# Patient Record
Sex: FEMALE | Race: WHITE
[De-identification: names, ages, dates, MRNs, and addresses within clinical notes are randomized per-mention and may not be internally consistent; named-entity substitution may affect disease eponyms.]

---

## 2021-04-18 ENCOUNTER — HOSPITAL ENCOUNTER (EMERGENCY)
Dept: HOSPITAL 47 - EC | Age: 31
Discharge: HOME | End: 2021-04-18
Payer: COMMERCIAL

## 2021-04-18 VITALS — RESPIRATION RATE: 18 BRPM | SYSTOLIC BLOOD PRESSURE: 100 MMHG | HEART RATE: 80 BPM | DIASTOLIC BLOOD PRESSURE: 66 MMHG

## 2021-04-18 VITALS — TEMPERATURE: 98 F

## 2021-04-18 DIAGNOSIS — O20.9: Primary | ICD-10-CM

## 2021-04-18 DIAGNOSIS — Z88.5: ICD-10-CM

## 2021-04-18 DIAGNOSIS — Z3A.14: ICD-10-CM

## 2021-04-18 PROCEDURE — 99283 EMERGENCY DEPT VISIT LOW MDM: CPT

## 2021-04-18 NOTE — ED
Female Urogenital HPI





- General


Chief complaint: Vaginal Bleeding


Stated complaint: Vaginal Bleeding, 14 Weeks Preg


Source: patient


Mode of arrival: wheelchair


Limitations: no limitations





- History of Present Illness


Initial comments: 


James is a 29 yo P4 2013 female in weeks pregnant who presents the ER today for

evaluation of sudden onset of painless vaginal bleeding.  Patient reports she's 

been getting regular prenatal care she has had ultrasound she has a single 

intrauterine pregnancy she is uncertain of the gender as of yet.  Pregnancy has 

been uncomplicated.  She was last sexually active yesterday with her partner.  

Patient reports that today she suddenly had a gush of dark blood.  No clots no 

products of conception.  Minimal cramping.








- Related Data


                                Home Medications











 Medication  Instructions  Recorded  Confirmed


 


Acetaminophen Tab [Tylenol Tab] 1,000 mg PO Q6HR PRN 04/18/21 04/18/21


 


Doxylamine Succinate [Unisom] 25 mg PO HS PRN 04/18/21 04/18/21


 


Pnv,Calcium 72/Iron/Folic Acid 1 tab PO DAILY 04/18/21 04/18/21





[Prenatal Plus Tablet]   


 


Pyridoxine HCl (Vitamin B6) 100 mg PO DAILY 04/18/21 04/18/21





[Vitamin B-6]   











                                    Allergies











Allergy/AdvReac Type Severity Reaction Status Date / Time


 


codeine AdvReac  Vomiting Verified 04/18/21 16:07














Review of Systems


ROS Statement: 


Those systems with pertinent positive or pertinent negative responses have been 

documented in the HPI.





ROS Other: All systems not noted in ROS Statement are negative.





General Exam





- General Exam Comments


Initial Comments: 


Physical Exam


GENERAL:


Patient is well-developed and well-nourished.  


Patient is nontoxic and well-hydrated and is in no distress.





HENT:


Normocephalic, Atraumatic. 





EYES:


PERRL, EOMI


No conjunctival pallor





PULMONARY:


Unlabored respirations.  No audible rales rhonchi or wheezing was noted.





CARDIOVASCULAR:


There is a regular rate and rhythm without any murmurs gallops or rubs.  





ABDOMEN:


Soft and nontender with normal bowel sounds. 





SKIN:


Skin is clear with no lesions or rashes and otherwise unremarkable.


No pallor





: 


Normal external genitalia


Dark blood in vault, no clots, no active bleeding


Cervical os closed





NEUROLOGIC:


Patient is alert and oriented x3.  Moving all extremities spontaneously





MUSCULOSKELETAL:


Normal extremities with adequate strength and full range of motion.  No lower 

extremity swelling or edema.  No calf tenderness.  





PSYCHIATRIC:


Normal psychiatric evaluation.





Limitations: no limitations





Course


                                   Vital Signs











  04/18/21 04/18/21





  14:48 16:18


 


Temperature 98.0 F 


 


Pulse Rate 104 H 80


 


Respiratory 16 18





Rate  


 


Blood Pressure 110/77 100/66


 


O2 Sat by Pulse 99 99





Oximetry  














Medical Decision Making





- Medical Decision Making


Was seen and evaluated history is obtained from the patient


Bedside ultrasound reveals an active fetus with a heart rate of 156


Are printed medical records available with her does confirm from her OB office 

her blood type is O+ there is no indication for [place]


Pelvic exam reveals a closed os with dark blood no clots in the vaginal vault


Supportive care and vaginal/rest were discussed with the patient, return 

parameters including development of any pallor, lightheadedness, chest pain, 

shortness of breath concern for blood loss or any new or concerning symptoms 

were discussed with the patient patient was discharged home in stable condition.








Disposition


Clinical Impression: 


 Vaginal bleeding before 22 weeks gestation





Disposition: HOME SELF-CARE


Condition: Stable


Additional Instructions: 


Maintain pelvic rest, follow up with your OB on Monday


Return for any worsening bleeding, chest pain, lightheadedness, shortness of 

breath or any new or concerning symptoms


Is patient prescribed a controlled substance at d/c from ED?: No


Referrals: 


None,Stated [Primary Care Provider] - 1-2 days